# Patient Record
Sex: FEMALE | Race: WHITE | Employment: OTHER | URBAN - METROPOLITAN AREA
[De-identification: names, ages, dates, MRNs, and addresses within clinical notes are randomized per-mention and may not be internally consistent; named-entity substitution may affect disease eponyms.]

---

## 2018-06-21 ENCOUNTER — RECORDS - HEALTHEAST (OUTPATIENT)
Dept: LAB | Facility: CLINIC | Age: 68
End: 2018-06-21

## 2018-06-21 LAB
ALBUMIN SERPL-MCNC: 3.7 G/DL (ref 3.5–5)
ALP SERPL-CCNC: 60 U/L (ref 45–120)
ALT SERPL W P-5'-P-CCNC: 11 U/L (ref 0–45)
ANION GAP SERPL CALCULATED.3IONS-SCNC: 11 MMOL/L (ref 5–18)
AST SERPL W P-5'-P-CCNC: 14 U/L (ref 0–40)
BILIRUB SERPL-MCNC: 0.7 MG/DL (ref 0–1)
BUN SERPL-MCNC: 8 MG/DL (ref 8–22)
CALCIUM SERPL-MCNC: 9.6 MG/DL (ref 8.5–10.5)
CHLORIDE BLD-SCNC: 89 MMOL/L (ref 98–107)
CHOLEST SERPL-MCNC: 213 MG/DL
CO2 SERPL-SCNC: 30 MMOL/L (ref 22–31)
CREAT SERPL-MCNC: 0.73 MG/DL (ref 0.6–1.1)
ERYTHROCYTE [DISTWIDTH] IN BLOOD BY AUTOMATED COUNT: 14 % (ref 11–14.5)
FASTING STATUS PATIENT QL REPORTED: ABNORMAL
GFR SERPL CREATININE-BSD FRML MDRD: >60 ML/MIN/1.73M2
GLUCOSE BLD-MCNC: 63 MG/DL (ref 70–125)
HCT VFR BLD AUTO: 39.8 % (ref 35–47)
HDLC SERPL-MCNC: 73 MG/DL
HGB BLD-MCNC: 13.7 G/DL (ref 12–16)
LDLC SERPL CALC-MCNC: 127 MG/DL
MAGNESIUM SERPL-MCNC: 2.1 MG/DL (ref 1.8–2.6)
MCH RBC QN AUTO: 30.9 PG (ref 27–34)
MCHC RBC AUTO-ENTMCNC: 34.4 G/DL (ref 32–36)
MCV RBC AUTO: 90 FL (ref 80–100)
PLATELET # BLD AUTO: 248 THOU/UL (ref 140–440)
PMV BLD AUTO: 9.2 FL (ref 8.5–12.5)
POTASSIUM BLD-SCNC: 3.4 MMOL/L (ref 3.5–5)
PROT SERPL-MCNC: 7.5 G/DL (ref 6–8)
RBC # BLD AUTO: 4.44 MILL/UL (ref 3.8–5.4)
SODIUM SERPL-SCNC: 130 MMOL/L (ref 136–145)
TRIGL SERPL-MCNC: 66 MG/DL
TSH SERPL DL<=0.005 MIU/L-ACNC: 1.6 UIU/ML (ref 0.3–5)
VIT B12 SERPL-MCNC: 1150 PG/ML (ref 213–816)
WBC: 4.2 THOU/UL (ref 4–11)

## 2018-06-22 LAB — HBA1C MFR BLD: 5.8 % (ref 4.2–6.1)

## 2018-06-27 ENCOUNTER — RECORDS - HEALTHEAST (OUTPATIENT)
Dept: LAB | Facility: CLINIC | Age: 68
End: 2018-06-27

## 2018-06-28 LAB — VALPROATE SERPL-MCNC: 119 UG/ML (ref 50–150)

## 2018-06-29 LAB — 25(OH)D3 SERPL-MCNC: 54.3 NG/ML (ref 30–80)

## 2018-07-09 ENCOUNTER — RECORDS - HEALTHEAST (OUTPATIENT)
Dept: LAB | Facility: CLINIC | Age: 68
End: 2018-07-09

## 2018-07-09 LAB
AMMONIA PLAS-SCNC: 29 UMOL/L (ref 11–35)
VALPROATE SERPL-MCNC: 92.4 UG/ML (ref 50–150)

## 2018-11-05 ENCOUNTER — HOSPITAL ENCOUNTER (OUTPATIENT)
Dept: MAMMOGRAPHY | Facility: CLINIC | Age: 68
Discharge: HOME | End: 2018-11-05
Attending: NURSE PRACTITIONER | Admitting: NURSE PRACTITIONER
Payer: MEDICARE

## 2018-11-05 DIAGNOSIS — Z12.31 VISIT FOR SCREENING MAMMOGRAM: ICD-10-CM

## 2018-11-05 PROCEDURE — 77063 BREAST TOMOSYNTHESIS BI: CPT

## 2019-01-15 ASSESSMENT — MIFFLIN-ST. JEOR: SCORE: 1249.4

## 2019-01-17 ENCOUNTER — ANESTHESIA (OUTPATIENT)
Dept: SURGERY | Facility: CLINIC | Age: 69
End: 2019-01-17
Payer: MEDICARE

## 2019-01-17 ENCOUNTER — HOSPITAL ENCOUNTER (OUTPATIENT)
Facility: CLINIC | Age: 69
Discharge: HOME | End: 2019-01-17
Attending: SPECIALIST | Admitting: SPECIALIST
Payer: MEDICARE

## 2019-01-17 ENCOUNTER — ANESTHESIA EVENT (OUTPATIENT)
Dept: SURGERY | Facility: CLINIC | Age: 69
End: 2019-01-17
Payer: MEDICARE

## 2019-01-17 VITALS
TEMPERATURE: 97.8 F | BODY MASS INDEX: 26.02 KG/M2 | OXYGEN SATURATION: 97 % | DIASTOLIC BLOOD PRESSURE: 73 MMHG | SYSTOLIC BLOOD PRESSURE: 104 MMHG | WEIGHT: 156.2 LBS | HEIGHT: 65 IN | RESPIRATION RATE: 18 BRPM

## 2019-01-17 PROCEDURE — 37000008 ZZH ANESTHESIA TECHNICAL FEE, 1ST 30 MIN: Performed by: SPECIALIST

## 2019-01-17 PROCEDURE — 25000125 ZZHC RX 250: Performed by: SPECIALIST

## 2019-01-17 PROCEDURE — 71000028 ZZH EYE RECOVERY PHASE 2 EACH 15 MINS: Performed by: SPECIALIST

## 2019-01-17 PROCEDURE — 27210794 ZZH OR GENERAL SUPPLY STERILE: Performed by: SPECIALIST

## 2019-01-17 PROCEDURE — 25000128 H RX IP 250 OP 636: Performed by: SPECIALIST

## 2019-01-17 PROCEDURE — 25000128 H RX IP 250 OP 636: Performed by: NURSE ANESTHETIST, CERTIFIED REGISTERED

## 2019-01-17 PROCEDURE — 40000170 ZZH STATISTIC PRE-PROCEDURE ASSESSMENT II: Performed by: SPECIALIST

## 2019-01-17 PROCEDURE — 25000128 H RX IP 250 OP 636: Performed by: ANESTHESIOLOGY

## 2019-01-17 PROCEDURE — 36000101 ZZH EYE SURGERY LEVEL 3 1ST 30 MIN: Performed by: SPECIALIST

## 2019-01-17 PROCEDURE — V2632 POST CHMBR INTRAOCULAR LENS: HCPCS | Performed by: SPECIALIST

## 2019-01-17 DEVICE — EYE IMP IOL ALCON PCL SN60WF ACRYSOF IQ 17.5: Type: IMPLANTABLE DEVICE | Site: EYE | Status: FUNCTIONAL

## 2019-01-17 RX ORDER — DICLOFENAC SODIUM 1 MG/ML
1 SOLUTION/ DROPS OPHTHALMIC
Status: COMPLETED | OUTPATIENT
Start: 2019-01-17 | End: 2019-01-17

## 2019-01-17 RX ORDER — LIDOCAINE HYDROCHLORIDE 10 MG/ML
INJECTION, SOLUTION EPIDURAL; INFILTRATION; INTRACAUDAL; PERINEURAL PRN
Status: DISCONTINUED | OUTPATIENT
Start: 2019-01-17 | End: 2019-01-17 | Stop reason: HOSPADM

## 2019-01-17 RX ORDER — PROPARACAINE HYDROCHLORIDE 5 MG/ML
1 SOLUTION/ DROPS OPHTHALMIC ONCE
Status: DISCONTINUED | OUTPATIENT
Start: 2019-01-17 | End: 2019-01-17 | Stop reason: HOSPADM

## 2019-01-17 RX ORDER — KETOROLAC TROMETHAMINE 5 MG/ML
SOLUTION OPHTHALMIC PRN
Status: DISCONTINUED | OUTPATIENT
Start: 2019-01-17 | End: 2019-01-17 | Stop reason: HOSPADM

## 2019-01-17 RX ORDER — MOXIFLOXACIN 5 MG/ML
SOLUTION/ DROPS OPHTHALMIC PRN
Status: DISCONTINUED | OUTPATIENT
Start: 2019-01-17 | End: 2019-01-17 | Stop reason: HOSPADM

## 2019-01-17 RX ORDER — TRAZODONE HYDROCHLORIDE 100 MG/1
100 TABLET ORAL AT BEDTIME
COMMUNITY

## 2019-01-17 RX ORDER — ONDANSETRON 2 MG/ML
INJECTION INTRAMUSCULAR; INTRAVENOUS PRN
Status: DISCONTINUED | OUTPATIENT
Start: 2019-01-17 | End: 2019-01-17

## 2019-01-17 RX ORDER — SODIUM CHLORIDE, SODIUM LACTATE, POTASSIUM CHLORIDE, CALCIUM CHLORIDE 600; 310; 30; 20 MG/100ML; MG/100ML; MG/100ML; MG/100ML
500 INJECTION, SOLUTION INTRAVENOUS CONTINUOUS
Status: DISCONTINUED | OUTPATIENT
Start: 2019-01-17 | End: 2019-01-17 | Stop reason: HOSPADM

## 2019-01-17 RX ORDER — TROPICAMIDE 10 MG/ML
1 SOLUTION/ DROPS OPHTHALMIC
Status: COMPLETED | OUTPATIENT
Start: 2019-01-17 | End: 2019-01-17

## 2019-01-17 RX ORDER — PRAVASTATIN SODIUM 20 MG
20 TABLET ORAL DAILY
COMMUNITY

## 2019-01-17 RX ORDER — MOXIFLOXACIN 5 MG/ML
1 SOLUTION/ DROPS OPHTHALMIC
Status: COMPLETED | OUTPATIENT
Start: 2019-01-17 | End: 2019-01-17

## 2019-01-17 RX ORDER — LEVOTHYROXINE SODIUM 75 UG/1
75 TABLET ORAL DAILY
COMMUNITY

## 2019-01-17 RX ORDER — PHENYLEPHRINE HYDROCHLORIDE 25 MG/ML
1 SOLUTION/ DROPS OPHTHALMIC
Status: COMPLETED | OUTPATIENT
Start: 2019-01-17 | End: 2019-01-17

## 2019-01-17 RX ORDER — BALANCED SALT SOLUTION 6.4; .75; .48; .3; 3.9; 1.7 MG/ML; MG/ML; MG/ML; MG/ML; MG/ML; MG/ML
SOLUTION OPHTHALMIC PRN
Status: DISCONTINUED | OUTPATIENT
Start: 2019-01-17 | End: 2019-01-17 | Stop reason: HOSPADM

## 2019-01-17 RX ORDER — LANOLIN ALCOHOL/MO/W.PET/CERES
3 CREAM (GRAM) TOPICAL
COMMUNITY

## 2019-01-17 RX ORDER — PREDNISOLONE ACETATE 10 MG/ML
SUSPENSION/ DROPS OPHTHALMIC PRN
Status: DISCONTINUED | OUTPATIENT
Start: 2019-01-17 | End: 2019-01-17 | Stop reason: HOSPADM

## 2019-01-17 RX ORDER — PROPARACAINE HYDROCHLORIDE 5 MG/ML
1 SOLUTION/ DROPS OPHTHALMIC ONCE
Status: COMPLETED | OUTPATIENT
Start: 2019-01-17 | End: 2019-01-17

## 2019-01-17 RX ADMIN — PHENYLEPHRINE HYDROCHLORIDE 1 DROP: 2.5 SOLUTION/ DROPS OPHTHALMIC at 06:41

## 2019-01-17 RX ADMIN — ONDANSETRON 4 MG: 2 INJECTION INTRAMUSCULAR; INTRAVENOUS at 07:36

## 2019-01-17 RX ADMIN — MIDAZOLAM 0.5 MG: 1 INJECTION INTRAMUSCULAR; INTRAVENOUS at 07:36

## 2019-01-17 RX ADMIN — DICLOFENAC SODIUM 1 DROP: 1 SOLUTION OPHTHALMIC at 06:49

## 2019-01-17 RX ADMIN — PHENYLEPHRINE HYDROCHLORIDE 1 DROP: 2.5 SOLUTION/ DROPS OPHTHALMIC at 06:49

## 2019-01-17 RX ADMIN — DICLOFENAC SODIUM 1 DROP: 1 SOLUTION OPHTHALMIC at 06:41

## 2019-01-17 RX ADMIN — MIDAZOLAM 1.5 MG: 1 INJECTION INTRAMUSCULAR; INTRAVENOUS at 07:28

## 2019-01-17 RX ADMIN — PHENYLEPHRINE HYDROCHLORIDE 1 DROP: 2.5 SOLUTION/ DROPS OPHTHALMIC at 06:36

## 2019-01-17 RX ADMIN — TROPICAMIDE 1 DROP: 10 SOLUTION/ DROPS OPHTHALMIC at 06:41

## 2019-01-17 RX ADMIN — SODIUM CHLORIDE, POTASSIUM CHLORIDE, SODIUM LACTATE AND CALCIUM CHLORIDE 500 ML: 600; 310; 30; 20 INJECTION, SOLUTION INTRAVENOUS at 06:59

## 2019-01-17 RX ADMIN — TROPICAMIDE 1 DROP: 10 SOLUTION/ DROPS OPHTHALMIC at 06:36

## 2019-01-17 RX ADMIN — DICLOFENAC SODIUM 1 DROP: 1 SOLUTION OPHTHALMIC at 06:36

## 2019-01-17 RX ADMIN — MOXIFLOXACIN 1 DROP: 5 SOLUTION/ DROPS OPHTHALMIC at 06:35

## 2019-01-17 RX ADMIN — MOXIFLOXACIN 1 DROP: 5 SOLUTION/ DROPS OPHTHALMIC at 06:41

## 2019-01-17 RX ADMIN — TROPICAMIDE 1 DROP: 10 SOLUTION/ DROPS OPHTHALMIC at 06:49

## 2019-01-17 RX ADMIN — PROPARACAINE HYDROCHLORIDE 1 DROP: 5 SOLUTION/ DROPS OPHTHALMIC at 06:37

## 2019-01-17 RX ADMIN — MOXIFLOXACIN 1 DROP: 5 SOLUTION/ DROPS OPHTHALMIC at 06:49

## 2019-01-17 ASSESSMENT — ENCOUNTER SYMPTOMS: SEIZURES: 0

## 2019-01-17 ASSESSMENT — LIFESTYLE VARIABLES: TOBACCO_USE: 0

## 2019-01-17 NOTE — DISCHARGE INSTRUCTIONS
[facility]est[facility] Discharge  Instructions  Anesthesia ([facility])   Adult Discharge Instructions    For 24 hours after surgery    1. Get plenty of rest.  Make arrangements to have a responsible adult stay with you for at least 24 hours after you leave the hospital.  2. Do not drive or use heavy equipment for 24 hours.    3. Do not drink alcohol for 24 hours.  4. Do not sign legal documents or make important decisions for 24 hours.  5. Avoid strenuous or risky activities. You may feel lightheaded.  If so, sit for a few minutes before standing.  Have someone help you get up.   6. Conscious sedation patients may resume a regular diet..  7. Any questions of medical nature, call your physician.        POST-OPERATIVE CARE FOLLOWING CATARACT SURGERY    DR. FLAVIO FORBES  [facility]  [phone]    Postoperative medications: After surgery, you will use eye drop medications. You may have either the brand specific form or generic of each type used.     Begin your eye drops today as directed.  You should instill the drop, close the eye without blinking and keep closed for 3 minutes allowing the drop to absorb. Place the shield for sleep.  In the morning, instill 1 drop in the eye before your post-op appointment.      Antibiotic  Anti-inflammatory   Steroid        Do not rub the operated eye. Wear the eye shield during sleeping hours for one week.      Light sensitivity may be noticed. Sunglasses may be worn for comfort.      Some discomfort and irritation may be noticed. Acetaminophen (Tylenol) or Ibuprofen (Advil) may be taken for discomfort.      Avoid bending over, strenuous activity or heavy lifting for one week.      Keep the operated eye dry.      You may wash your hair, bathe or shower, but keep the operated eye closed while doing so.      Use medication exactly as prescribed by your doctor.  You may restart your regular home medications.      Bring all materials and medications  to the clinic on your first post-operative visit.      Call the doctor s office if any of the following should occur:  -  any sudden vision change  -  nausea or severe headache  -  increase in pain not controlled  -  increased amount of floaters (black spots in front of vision)         -  or signs of infection (pus, increasing redness or tenderness)

## 2019-01-17 NOTE — ANESTHESIA CARE TRANSFER NOTE
Patient: Rodney Mcknight    Procedure(s):  LEFT EYE PHACOEMULSIFICATION CLEAR CORNEA WITH STANDARD INTRAOCULAR LENS IMPLANT    Diagnosis: CATARACT LEFT EYE   Diagnosis Additional Information: No value filed.    Anesthesia Type:   MAC     Note:  Airway :Room Air  Patient transferred to:PACU  Comments: Pt exhibits spont resps, all monitors and alarms on in pacu, report given to RN, vss.Handoff Report: Identifed the Patient, Identified the Reponsible Provider, Reviewed the pertinent medical history, Discussed the surgical course, Reviewed Intra-OP anesthesia mangement and issues during anesthesia, Set expectations for post-procedure period and Allowed opportunity for questions and acknowledgement of understanding      Vitals: (Last set prior to Anesthesia Care Transfer)    CRNA VITALS  1/17/2019 0718 - 1/17/2019 0755      1/17/2019             Pulse:  75    Ht Rate:  75    SpO2:  99 %    Resp Rate (set):  10                Electronically Signed By: JAVED Gary CRNA  January 17, 2019  7:55 AM

## 2019-01-17 NOTE — OP NOTE
[facility]  Ophthalmology Operative Note  PRE-OPERATIVE DIAGNOSIS: Cataract of the Left eye.   POST-OPERATIVE DIAGNOSIS: Pseudophakia of the Left eye.   OPERATION: Phacoemulsification with posterior chamber intraocular lens Left eye.   SURGEON: FLAVIO FORBES MD  ANESTHESIA: Combined MAC with Topical  ESTIMATED BLOOD LOSS: Less than 1 cc   COMPLICATIONS: None.   INDICATIONS FOR OPERATION: Prior to the procedure being undertaken, risks, benefits and alternatives were discussed fully with Rodney Mcknight. All of the patient's questions were answered to her satisfaction and she agreed to go through with the surgery. Her best corrected visual acuity in the Left eye prior to surgery was approximately 20/100.   OPERATIVE PROCEDURE: The patient was given dilation and anesthetic jelly to the Left eye in the preoperative holding area. The patient was then brought into the operating suite and the left eye was prepped and draped in the usual sterile manner. A paracentesis tract was made inferotemporally and the anterior chamber was filled with methylparaben free lidocaine. Next, the anterior chamber was filled with Viscoat.   A clear corneal incision was made temporally using a 2.4 mm slit knife and a continuous tear capsulorrhexis was performed. The nucleus was hydrodissected and removed with phacoemulsification. The remaining cortex was removed with irrigation and aspiration. The posterior capsule was polished.   Next, the capsular bag was filled with Provisc and an intraocular lens was injected into the capsular bag. The lens centered nicely. The viscoelastic was then removed with irrigation and aspiration. The anterior chamber was filled with BSS. The wounds were checked and found to be water tight. The eye was palpated and felt to be of normal eye pressure. One drop of pred/gati/brom was instilled into the left eye. The eye was shielded and the patient left the operating room in good condition.      Implant Name Type Inv. Item Serial No.  Lot No. LRB No. Used   EYE IMP IOL JEN PCL [id number] ACRYSOF IQ 17.5 Lens/Eye Implant EYE IMP IOL JEN PCL [id number] ACRYSOF IQ 17.5 [id number] [employer] [facility]  Left 1       Michael Guidry MD

## 2019-01-17 NOTE — ANESTHESIA PREPROCEDURE EVALUATION
Anesthesia Pre-Procedure Evaluation    Patient: Rodney Mcknight   MRN: [id number] : 1950          Preoperative Diagnosis: CATARACT LEFT EYE     Procedure(s):  LEFT EYE PHACOEMULSIFICATION CLEAR CORNEA WITH STANDARD INTRAOCULAR LENS IMPLANT (MAC/TOP)    Past Medical History:   Diagnosis Date     Arthritis     osteoarthritis     Hyperlipidemia      Hypokalemia      Thyroid disease     hypothyroidism     Urinary incontinence      Past Surgical History:   Procedure Laterality Date     BREAST SURGERY      right breast bx     ENT SURGERY      tonsillectomy, thyroid adenoma     EYE SURGERY      right phaco     GENITOURINARY SURGERY      urinary sling       Anesthesia Evaluation     . Pt has had prior anesthetic.     No history of anesthetic complications          ROS/MED HX    ENT/Pulmonary:      (-) tobacco use and sleep apnea   Neurologic:      (-) seizures and CVA   Cardiovascular:     (+) Dyslipidemia, ----. : . . . :. .       METS/Exercise Tolerance:  >4 METS   Hematologic:         Musculoskeletal:         GI/Hepatic:        (-) GERD and liver disease   Renal/Genitourinary:      (-) renal disease   Endo:     (+) thyroid problem hypothyroidism, .   (-) Type I DM   Psychiatric:     (+) psychiatric history anxiety and depression      Infectious Disease:         Malignancy:         Other:                          Physical Exam  Normal systems: cardiovascular, pulmonary and dental    Airway   Mallampati: II  TM distance: >3 FB  Neck ROM: full    Dental     Cardiovascular       Pulmonary             No results found for: WBC, HGB, HCT, PLT, CRP, SED, NA, POTASSIUM, CHLORIDE, CO2, BUN, CR, GLC, NI, PHOS, MAG, ALBUMIN, PROTTOTAL, ALT, AST, GGT, ALKPHOS, BILITOTAL, BILIDIRECT, LIPASE, AMYLASE, KAYLEY, PTT, INR, FIBR, TSH, T4, T3, HCG, HCGS, CKTOTAL, CKMB, TROPN    Preop Vitals  BP Readings from Last 3 Encounters:   No data found for BP    Pulse Readings from Last 3 Encounters:   No data found for Pulse      Resp Readings  "from Last 3 Encounters:   No data found for Resp    SpO2 Readings from Last 3 Encounters:   No data found for SpO2      Temp Readings from Last 1 Encounters:   No data found for Temp    Ht Readings from Last 1 Encounters:   01/15/19 1.651 m (5' 5\")      Wt Readings from Last 1 Encounters:   01/15/19 70.9 kg (156 lb 3.2 oz)    Estimated body mass index is 25.99 kg/m  as calculated from the following:    Height as of this encounter: 1.651 m (5' 5\").    Weight as of this encounter: 70.9 kg (156 lb 3.2 oz).       Anesthesia Plan      History & Physical Review  History and physical reviewed and following examination; no interval change.    ASA Status:  2 .    NPO Status:  > 8 hours    Plan for MAC Reason for MAC:  Procedure to face, neck, head or breast  PONV prophylaxis:  Ondansetron (or other 5HT-3)       Postoperative Care      Consents  Anesthetic plan, risks, benefits and alternatives discussed with:  Patient..        Procedure: Procedure(s):  LEFT EYE PHACOEMULSIFICATION CLEAR CORNEA WITH STANDARD INTRAOCULAR LENS IMPLANT (MAC/TOP)  Preop diagnosis: CATARACT LEFT EYE     Allergies   Allergen Reactions     Lamictal [Lamotrigine] Hives     Past Medical History:   Diagnosis Date     Arthritis     osteoarthritis     Hyperlipidemia      Hypokalemia      Thyroid disease     hypothyroidism     Urinary incontinence      Past Surgical History:   Procedure Laterality Date     BREAST SURGERY      right breast bx     ENT SURGERY      tonsillectomy, thyroid adenoma     EYE SURGERY      right phaco     GENITOURINARY SURGERY      urinary sling     Prior to Admission medications    Medication Sig Start Date End Date Taking? Authorizing Provider   cholecalciferol (VITAMIN D3) 5000 units (125 mcg) capsule Take 5,000 Units by mouth daily   Yes Reported, Patient   DIVALPROEX SODIUM PO Take 1,000 mg by mouth At Bedtime   Yes Reported, Patient   levothyroxine (SYNTHROID/LEVOTHROID) 75 MCG tablet Take 75 mcg by mouth daily   Yes " Reported, Patient   melatonin 3 MG tablet Take 3 mg by mouth nightly as needed for sleep   Yes Reported, Patient   Potassium Chloride (EPIKLOR PO) Take 40 mEq by mouth 2 times daily   Yes Reported, Patient   pravastatin (PRAVACHOL) 20 MG tablet Take 20 mg by mouth daily   Yes Reported, Patient   ranitidine (ZANTAC) 150 MG tablet Take 75 mg by mouth 2 times daily   Yes Reported, Patient   traZODone (DESYREL) 100 MG tablet Take 100 mg by mouth At Bedtime   Yes Reported, Patient     Current Facility-Administered Medications Ordered in Epic   Medication Dose Route Frequency Last Rate Last Dose     lidocaine (AKTEN) ophthalmic gel 0.5 mL  0.5 mL Ophthalmic Once         povidone-iodine (BETADINE) 5 % ophthalmic solution 1 drop  1 drop Ophthalmic Once         proparacaine (ALCAINE) 0.5 % ophthalmic solution 1 drop  1 drop Ophthalmic Once         No current [facility]-ordered outpatient medications on file.     Wt Readings from Last 1 Encounters:   01/15/19 70.9 kg (156 lb 3.2 oz)     Temp Readings from Last 1 Encounters:   No data found for Temp     BP Readings from Last 6 Encounters:   No data found for BP     Pulse Readings from Last 4 Encounters:   No data found for Pulse     Resp Readings from Last 1 Encounters:   No data found for Resp   @LASTSAO2(1)@  No results for input(s): NA, POTASSIUM, CHLORIDE, CO2, ANIONGAP, GLC, BUN, CR, NI in the last 92979 hours.  No results for input(s): WBC, HGB, PLT in the last 37535 hours.  No results for input(s): INR in the last 53746 hours.    Invalid input(s): APTT   RECENT LABS:   ECG:   ECHO:   CXR:               Mushtaq Anderson MD

## 2019-01-17 NOTE — ANESTHESIA POSTPROCEDURE EVALUATION
Patient: Rodney Mcknight    Procedure(s):  LEFT EYE PHACOEMULSIFICATION CLEAR CORNEA WITH STANDARD INTRAOCULAR LENS IMPLANT    Diagnosis:CATARACT LEFT EYE   Diagnosis Additional Information: No value filed.    Anesthesia Type:  MAC    Note:  Anesthesia Post Evaluation    Patient location during evaluation: PACU  Patient participation: Able to fully participate in evaluation  Level of consciousness: awake and alert  Pain management: satisfactory to patient  Airway patency: patent  Cardiovascular status: hemodynamically stable  Respiratory status: acceptable and unassisted  Hydration status: balanced  PONV: none     Anesthetic complications: None          Last vitals:  Vitals:    01/17/19 0650 01/17/19 0752 01/17/19 0811   BP: 123/89 110/76 104/73   Resp: 16 18 18   Temp: 36.6  C (97.8  F)     SpO2: 98% 100% 97%         Electronically Signed By: Mushtaq Anderson MD  January 17, 2019  2:54 PM

## (undated) DEVICE — EYE SOL BSS 500ML

## (undated) DEVICE — GLOVE PROTEXIS MICRO 7.0  2D73PM70

## (undated) DEVICE — Device

## (undated) DEVICE — EYE PACK BVI READYPAK KIT #1

## (undated) DEVICE — EYE PACK CUSTOM ANTERIOR 30DEG TIP CENTURION PPK6682-04

## (undated) DEVICE — EYE KNIFE CRESCENT 2.0MM ANGLED PE3720A

## (undated) DEVICE — EYE KNIFE SLIT XSTAR VISITEC 2.4MM 45DEG BEVEL UP 373724

## (undated) DEVICE — EYE SOL BSS 15ML BOTTLE 65079515

## (undated) DEVICE — EYE SHIELD PLASTIC

## (undated) DEVICE — LINEN TOWEL PACK X5 5464

## (undated) DEVICE — PACK CATARACT CUSTOM SO DALE SEY32CTFCX

## (undated) DEVICE — GLOVE PROTEXIS MICRO 7.5  2D73PM75

## (undated) DEVICE — GLOVE PROTEXIS MICRO 6.5  2D73PM65

## (undated) RX ORDER — LIDOCAINE HYDROCHLORIDE 10 MG/ML
INJECTION, SOLUTION EPIDURAL; INFILTRATION; INTRACAUDAL; PERINEURAL
Status: DISPENSED
Start: 2019-01-17

## (undated) RX ORDER — PREDNISOLONE ACETATE 10 MG/ML
SUSPENSION/ DROPS OPHTHALMIC
Status: DISPENSED
Start: 2019-01-17

## (undated) RX ORDER — KETOROLAC TROMETHAMINE 5 MG/ML
SOLUTION OPHTHALMIC
Status: DISPENSED
Start: 2019-01-17

## (undated) RX ORDER — MOXIFLOXACIN 5 MG/ML
SOLUTION/ DROPS OPHTHALMIC
Status: DISPENSED
Start: 2019-01-17